# Patient Record
Sex: MALE | Race: WHITE | NOT HISPANIC OR LATINO | Employment: FULL TIME | URBAN - METROPOLITAN AREA
[De-identification: names, ages, dates, MRNs, and addresses within clinical notes are randomized per-mention and may not be internally consistent; named-entity substitution may affect disease eponyms.]

---

## 2021-06-01 ENCOUNTER — RECORDS - HEALTHEAST (OUTPATIENT)
Dept: ADMINISTRATIVE | Facility: CLINIC | Age: 26
End: 2021-06-01

## 2023-11-10 ENCOUNTER — OFFICE VISIT (OUTPATIENT)
Dept: FAMILY MEDICINE | Facility: CLINIC | Age: 28
End: 2023-11-10
Payer: COMMERCIAL

## 2023-11-10 VITALS
DIASTOLIC BLOOD PRESSURE: 60 MMHG | TEMPERATURE: 98.1 F | SYSTOLIC BLOOD PRESSURE: 100 MMHG | WEIGHT: 125 LBS | HEIGHT: 69 IN | RESPIRATION RATE: 15 BRPM | OXYGEN SATURATION: 99 % | HEART RATE: 88 BPM | BODY MASS INDEX: 18.51 KG/M2

## 2023-11-10 DIAGNOSIS — L03.114 CELLULITIS OF LEFT UPPER EXTREMITY: Primary | ICD-10-CM

## 2023-11-10 PROCEDURE — 99203 OFFICE O/P NEW LOW 30 MIN: CPT | Performed by: NURSE PRACTITIONER

## 2023-11-10 RX ORDER — SULFAMETHOXAZOLE/TRIMETHOPRIM 800-160 MG
1 TABLET ORAL 2 TIMES DAILY
Qty: 20 TABLET | Refills: 0 | Status: SHIPPED | OUTPATIENT
Start: 2023-11-10

## 2023-11-10 NOTE — PROGRESS NOTES
"Assessment & Plan     ICD-10-CM    1. Cellulitis of left upper extremity  L03.114 sulfamethoxazole-trimethoprim (BACTRIM DS) 800-160 MG tablet        Suspect cellulitis.  Start Bactrim.  Keep the area clean.  There is a possibility of an allergic reaction but not every part of the blue ink has the surrounding redness and blistering so that feels a little less likely at this point.  Reviewed red flag signs.  Send me a message via [employer] if symptoms are failing to improve as anticipated or worsening.    Subjective     HPI     Patient presents today with concerns of possible cellulitis.  Received a new tattoo and started to develop redness and significant pain.  Having some itching.  He has been cleaning the area.  Has noticed some blistering.  No systemic ill symptoms.                Review of Systems - negative except for what's listed in the HPI      Objective    /60   Pulse 88   Temp 98.1  F (36.7  C)   Resp 15   Ht 1.759 m (5' 9.25\")   Wt 56.7 kg (125 lb)   SpO2 99%   BMI 18.33 kg/m    Physical Exam   General appearance - alert, well appearing, and in no distress  Mental status - alert, oriented to person, place, and time  Neurological -grossly intact  Musculoskeletal -grossly intact  Extremities - no peripheral edema  Skin -along the area of tattoo there is erythema that is blanchable.  Hot to touch.  Blisters noted with in various stages of healing.  The blistering and redness does not occur along every area of the blue coloration on the tattoo.    Vince Wiley, CNP    This note has been dictated using voice recognition software. Any grammatical or context distortions are unintentional and inherent to the software.            "

## 2023-11-10 NOTE — PATIENT INSTRUCTIONS
I went ahead and sent the antibiotic Bactrim to your pharmacy.  The generic name for this is called sulfamethoxazole trimethoprim.  Take twice daily with food.    Try to keep the area as clean as possible.    Send me a message via [employer] if this is failing to improve as anticipated or worsening.    If you notice red streaking going up the arm, especially with ill symptoms then go to the emergency department.